# Patient Record
Sex: MALE | Race: WHITE | Employment: OTHER | URBAN - METROPOLITAN AREA
[De-identification: names, ages, dates, MRNs, and addresses within clinical notes are randomized per-mention and may not be internally consistent; named-entity substitution may affect disease eponyms.]

---

## 2018-07-08 ENCOUNTER — HOSPITAL ENCOUNTER (OUTPATIENT)
Age: 43
Discharge: HOME | End: 2018-07-08
Attending: EMERGENCY MEDICINE
Payer: COMMERCIAL

## 2018-07-08 VITALS
OXYGEN SATURATION: 100 % | SYSTOLIC BLOOD PRESSURE: 133 MMHG | BODY MASS INDEX: 29.4 KG/M2 | RESPIRATION RATE: 18 BRPM | WEIGHT: 210 LBS | DIASTOLIC BLOOD PRESSURE: 90 MMHG | HEIGHT: 71 IN | TEMPERATURE: 98 F | HEART RATE: 80 BPM

## 2018-07-08 DIAGNOSIS — R39.198 ABNORMALITY OF URINATION: ICD-10-CM

## 2018-07-08 DIAGNOSIS — Q64.32 CONGENITAL STRICTURE OF URETHRA: Primary | ICD-10-CM

## 2018-07-08 LAB
BILIRUB UR QL STRIP: NEGATIVE
CLARITY UR: CLEAR
COLOR UR: YELLOW
GLUCOSE BLDC GLUCOMTR-MCNC: 161 MG/DL (ref 70–99)
GLUCOSE UR STRIP-MCNC: 500 MG/DL
HGB UR QL STRIP: NEGATIVE
KETONES UR STRIP-MCNC: NEGATIVE MG/DL
LEUKOCYTE ESTERASE UR QL STRIP: NEGATIVE
NITRITE UR QL STRIP: NEGATIVE
PH UR STRIP: 5.5 [PH]
PROT UR STRIP-MCNC: NEGATIVE MG/DL
SP GR UR STRIP: >=1.03
UROBILINOGEN UR STRIP-ACNC: <2 MG/DL

## 2018-07-08 PROCEDURE — 99204 OFFICE O/P NEW MOD 45 MIN: CPT

## 2018-07-08 PROCEDURE — 87086 URINE CULTURE/COLONY COUNT: CPT | Performed by: EMERGENCY MEDICINE

## 2018-07-08 PROCEDURE — 99203 OFFICE O/P NEW LOW 30 MIN: CPT

## 2018-07-08 PROCEDURE — 81003 URINALYSIS AUTO W/O SCOPE: CPT

## 2018-07-08 PROCEDURE — 82962 GLUCOSE BLOOD TEST: CPT

## 2018-07-08 RX ORDER — MULTIVITAMIN
TABLET ORAL
COMMUNITY

## 2018-07-08 RX ORDER — LISINOPRIL 5 MG/1
TABLET ORAL
COMMUNITY
Start: 2018-02-14

## 2018-07-08 NOTE — ED NOTES
Patient instructed to follow up with PMD for elevated sugar and glucose in urine and to see urologist.

## 2018-07-08 NOTE — ED PROVIDER NOTES
Patient Seen in: [facility] In [address] [place] [address]    History   Patient presents with:  Urinary Symptoms (urologic)    Stated Complaint: blood in urine    HPI    The patient is a 40-year-old male with a history of hypospadias, stricture at the (5' 11\")   Wt 95.3 kg   SpO2 100%   BMI 29.29 kg/m²         Physical Exam    Alert male no acute distress  Abdomen: Normoactive bowel sounds, nontender nondistended  Back: No CVA tenderness  : Uncircumcised  Stricture at the meatus noted  Nontender test

## 2024-04-11 ENCOUNTER — HOSPITAL ENCOUNTER (OUTPATIENT)
Age: 49
Discharge: HOME | End: 2024-04-11

## 2024-04-11 ENCOUNTER — APPOINTMENT (OUTPATIENT)
Dept: GENERAL RADIOLOGY | Age: 49
End: 2024-04-11
Attending: NURSE PRACTITIONER

## 2024-04-11 VITALS
RESPIRATION RATE: 16 BRPM | TEMPERATURE: 98 F | OXYGEN SATURATION: 96 % | DIASTOLIC BLOOD PRESSURE: 93 MMHG | HEART RATE: 84 BPM | SYSTOLIC BLOOD PRESSURE: 156 MMHG

## 2024-04-11 DIAGNOSIS — S13.9XXA CERVICAL SPRAIN, INITIAL ENCOUNTER: ICD-10-CM
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
## 2024-04-11 PROCEDURE — 72040 X-RAY EXAM NECK SPINE 2-3 VW: CPT | Performed by: NURSE PRACTITIONER

## 2024-04-11 PROCEDURE — 99203 OFFICE O/P NEW LOW 30 MIN: CPT | Performed by: NURSE PRACTITIONER

## 2024-04-11 RX ORDER — CYCLOBENZAPRINE HCL 10 MG
10 TABLET ORAL 3 TIMES DAILY PRN
Qty: 20 TABLET | Refills: 0 | Status: SHIPPED | OUTPATIENT
Start: 2024-04-11 | End: 2024-04-18
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
## 2024-04-11 NOTE — DISCHARGE INSTRUCTIONS
Tylenol/ibuprofen as needed for pain  Ice to your sore areas over the next day then use heat  Do not drink or drive if taking muscle relaxer  Follow-up with your primary care doctor as needed  Return to the emergency department for any new or worsening symptoms at any time

## 2024-10-28 ENCOUNTER — OFFICE VISIT (OUTPATIENT)
Dept: SURGERY | Facility: CLINIC | Age: 49
End: 2024-10-28

## 2024-10-28 DIAGNOSIS — N35.811 OTHER STRICTURE OF URETHRAL MEATUS IN MALE: ICD-10-CM

## 2024-10-28 DIAGNOSIS — R97.20 ELEVATED PSA: Primary | ICD-10-CM

## 2024-10-28 DIAGNOSIS — R82.90 URINE FINDING: ICD-10-CM

## 2024-10-28 DIAGNOSIS — Q54.1 PENILE HYPOSPADIAS: ICD-10-CM

## 2024-10-28 LAB
BILIRUBIN: NEGATIVE
GLUCOSE (URINE DIPSTICK): 100 MG/DL
KETONES (URINE DIPSTICK): NEGATIVE MG/DL
MULTISTIX LOT#: ABNORMAL NUMERIC
NITRITE, URINE: NEGATIVE
PH, URINE: 5.5 (ref 4.5–8)
PROTEIN (URINE DIPSTICK): NEGATIVE MG/DL
SPECIFIC GRAVITY: 1.01 (ref 1–1.03)
URINE-COLOR: YELLOW
UROBILINOGEN,SEMI-QN: 0.2 MG/DL (ref 0–1.9)

## 2024-10-28 PROCEDURE — 51798 US URINE CAPACITY MEASURE: CPT

## 2024-10-28 PROCEDURE — 81003 URINALYSIS AUTO W/O SCOPE: CPT

## 2024-10-28 PROCEDURE — 99204 OFFICE O/P NEW MOD 45 MIN: CPT

## 2024-10-28 RX ORDER — METFORMIN HYDROCHLORIDE 500 MG/1
1 TABLET, EXTENDED RELEASE ORAL 2 TIMES DAILY WITH MEALS
COMMUNITY
Start: 2022-02-14

## 2024-10-28 RX ORDER — TAMSULOSIN HYDROCHLORIDE 0.4 MG/1
0.4 CAPSULE ORAL EVERY EVENING
Qty: 90 CAPSULE | Refills: 3 | Status: SHIPPED | OUTPATIENT
Start: 2024-10-28

## 2024-10-28 RX ORDER — DIAZEPAM 10 MG/1
10 TABLET ORAL SEE ADMIN INSTRUCTIONS
Qty: 2 TABLET | Refills: 0 | Status: SHIPPED | OUTPATIENT
Start: 2024-10-28

## 2024-10-28 NOTE — PROGRESS NOTES
[facility], [facility]    Urology Consult Note    History of Present Illness:   Patient is a(n) 49 year old male with hx of hypospadia who presents for elevated PSA.    Recently had routine PSA checked on annual that returned at 3.24 on 7/22/24.     Prior PSAs   11/10/23 1.47  4/12/23 1.52  10/8/22 1.75  All prior <1.7     Hx of stricture vs. hypospadia that required dilation in 2018 with [facility].  Sx at the time with recurrent UTI, split stream, weak stream. Dilation was done in office and pt had lots of pain. Was supposed to CIC afterwards but could not tolerate. Noticed large stream improvement afterwards. Uro at the time recommended ?slit procedure for ?hypospadia following dilation but he opted against.     Stream getting weaker in the past couple yrs. No recurrent UTIs since dilation. Now with increasing frequency and urgency, especially at night. PVR today 366mL before double voiding. 185mL afterwards. Occasionally has an episode every few mos where he cannot void unless shifting penis a certain way, then large amount comes out.     The only records I can see is from 2010 when he first started getting UTIs, weak stream, and split stream. Ultimately decided to go with conservative mgmt since PVRs were low at the time despite presence of hypospadia.     UA today trace intact blood. No urinary complaints of dysuria or gross hematuria.     No issues with erections.     HISTORY:  Past Medical History:    Hypospadias      Past Surgical History:   Procedure Laterality Date    Hernia surgery  2009    umbilical and LIH    Other surgical history  age 6    surg for bleeding ulcer    Other surgical history  10/11/10    Dr. Slick Belle      Family History   Problem Relation Age of Onset    Other Mother         GERD    Lipids Brother       Social History:   Social History     Socioeconomic History    Marital status: Single   Tobacco Use    Smoking status: Never    Smokeless tobacco:  Never   Substance and Sexual Activity    Alcohol use: Yes     Comment: rare    Drug use: No     Social Drivers of Health      Received from [facility]    Social Connections    Received from [facility]    Housing Stability        Allergies  Allergies[1]    Review of Systems:   A 10-point review of systems was completed and is negative other than as noted above.    Physical Exam:   There were no vitals taken for this visit.    GENERAL APPEARANCE: no acute distress  NEUROLOGIC: converses appropriately  HEAD: atraumatic, normocephalic  LUNGS: non-labored breathing  ABDOMEN: soft, nontender, non-distended  BACK: no CVA tenderness  PSYCH: appropriate affect and mood  INGUINAL CANALS: no hernias  PENILE MEATUS: open and very narrowed, possible coronal/subcoronal hypospadia   PENIS: normal  SCROTUM: normal, no varicocele  TESTES: normal anatomy  EPIDIDYMIS: normal anatomy      Results:     Laboratory Data:  Lab Results   Component Value Date    WBC 6.0 07/21/2010     07/21/2010     Lab Results   Component Value Date     05/05/2011    K 4.3 05/05/2011     05/05/2011    CO2 24 05/05/2011    BUN 13 05/05/2011    AST 28 07/21/2010    ALT 44 07/21/2010    TP 7.7 07/21/2010    ALB 4.9 07/21/2010       Urinalysis Results (last 3 years):  Recent Labs     10/28/24  1051   SPECGRAVITY 1.015   PHURINE 5.5   NITRITE Negative       Urine Culture Results (last 3 years):  Lab Results   Component Value Date    URINECUL No Growth at 18-24 hrs. 07/08/2018       Imaging  No results found.      Impression:   Recommendations:  Elevated PSA  - repeat PSA and if elevated, discussed need for 4k, MRI, and biopsy    Hypospadia  Urethral stricture  Urinary retention  - RTC for cysto; requested valium  - discussed can trial tamsulosin but unlikely this will have large benefit    Thank you very much for this consult. Please call if there are any questions or concerns.     Denise Rush,  YANIRA  Urology  [facility]  Phone: [phone]    Date: 10/28/2024  Time: 11:03 AM         [1] No Known Allergies

## 2024-11-04 ENCOUNTER — LAB ENCOUNTER (OUTPATIENT)
Dept: LAB | Age: 49
End: 2024-11-04
Payer: COMMERCIAL

## 2024-11-04 ENCOUNTER — TELEPHONE (OUTPATIENT)
Dept: SURGERY | Facility: CLINIC | Age: 49
End: 2024-11-04

## 2024-11-04 DIAGNOSIS — R97.20 ELEVATED PSA: Primary | ICD-10-CM

## 2024-11-04 PROCEDURE — 36415 COLL VENOUS BLD VENIPUNCTURE: CPT

## 2024-11-04 PROCEDURE — 84154 ASSAY OF PSA FREE: CPT

## 2024-11-04 PROCEDURE — 84153 ASSAY OF PSA TOTAL: CPT

## 2024-11-04 NOTE — TELEPHONE ENCOUNTER
Pt called.  Appointment on 10-28-24.  Went to the lab.  Advised there was no order for the psa blood test.  Please call pt

## 2024-11-04 NOTE — TELEPHONE ENCOUNTER
RN placed and order of repeat PSA per YANIRA Walker's notes on 10/28/24  RN called patient and updated. He agreed to completing it today.

## 2024-11-06 ENCOUNTER — LAB ENCOUNTER (OUTPATIENT)
Dept: LAB | Age: 49
End: 2024-11-06
Payer: COMMERCIAL

## 2024-11-06 DIAGNOSIS — R97.20 ELEVATED PSA: Primary | ICD-10-CM

## 2024-11-06 LAB
PROSTATE SPECIFIC AG: 3.6 NG/ML
PROSTATE SPECIFIC AG: 3.6 NG/ML

## 2024-11-06 PROCEDURE — 36415 COLL VENOUS BLD VENIPUNCTURE: CPT

## 2024-11-08 ENCOUNTER — TELEPHONE (OUTPATIENT)
Dept: SURGERY | Facility: CLINIC | Age: 49
End: 2024-11-08

## 2024-11-08 DIAGNOSIS — R97.20 ELEVATED PSA: Primary | ICD-10-CM

## 2024-11-08 NOTE — TELEPHONE ENCOUNTER
Per patient the soonest he could get scheduled for an mri is in December and was told that if Denise changed the order to stat he could get in next week. Please advise

## 2024-11-19 ENCOUNTER — HOSPITAL ENCOUNTER (OUTPATIENT)
Dept: MRI IMAGING | Facility: HOSPITAL | Age: 49
Discharge: HOME | End: 2024-11-19
Payer: COMMERCIAL

## 2024-11-19 DIAGNOSIS — R97.20 ELEVATED PSA: ICD-10-CM

## 2024-11-19 PROCEDURE — 72197 MRI PELVIS W/O & W/DYE: CPT

## 2024-11-19 PROCEDURE — A9575 INJ GADOTERATE MEGLUMI 0.1ML: HCPCS

## 2024-11-19 RX ORDER — GADOTERATE MEGLUMINE 376.9 MG/ML
20 INJECTION INTRAVENOUS
Status: COMPLETED | OUTPATIENT
Start: 2024-11-19 | End: 2024-11-19

## 2024-11-19 RX ADMIN — GADOTERATE MEGLUMINE 20 ML: 376.9 INJECTION INTRAVENOUS at 09:11:00

## 2024-11-20 NOTE — H&P (VIEW-ONLY)
HPI:     Colton Chris is a 49 year old male with a PMH of HTN, DM.    New to me, saw Denise in the past.    Following for:  1. Elevated PSA   2. H/o hypospadias and stricture  - s/p dilation in 2018 with [facility]  3. Recurrent UTIs    PCP - [facility]  Prior Urologist - Denise 10/28/24, Oh 10/11/10    Presents to establish care with me, review pMRI, office cysto, discuss next steps.  Had split stream, weak stream, recurrent UTI in 2018 and required dilation for urethral stricture. Stream improved significantly afterwards. Uro recommended slit procedure for hypospadias at that time but pt declined.    Stream has been getting weaker for the past couple years. No UTIs since dilation. Occasionally has episode every few mo where he cannot void unless he shifts penis in certain way, then large amount comes out.    AUA SS is 7/35 with 2 n, VINNY; 1 w, f. Mixed feelings towards LUTS.  Incontinence: none  Penoscrotal: severe meatal stenosis, subcoronal hypospadias    UA is negative and PVR is 39 mL. Was 366 mL and double voided with repeat  mL.    Prior PSAs:  - 3.6 11/4/24  - 3.24 7/22/24  - 1.47 11/10/23  - 1.52 4/12/23  - 1.75 10/8/22  - < 2 y prior    pMRI 11/19/24: ~ 28 g, Pi2    I attempted to perform meatal dilation at the bedside and the patient started writhing around even during attempts to place the lidocaine jelly.  After discussing options he would like to proceed with meatal dilation and flexible cystoscopy under anesthesia. We discussed the risks and benefits to the procedure including, but not limited to, bleeding, infection, possible damage to surrounding structures. The patient understands and would like to proceed.    I would likely recommend he perform self dilation following the procedure ~ 2 x per week for a few months following to prevent recurrence. Continue annual PSA checks given negative pMRI.    HISTORY:  Past Medical History:    Hypospadias      Past Surgical History:   Procedure  Laterality Date    Hernia surgery  2009    umbilical and LIH    Other surgical history  age 6    surg for bleeding ulcer    Other surgical history  10/11/10    [facility], Dr. Kruger      Family History   Problem Relation Age of Onset    Other Mother         GERD    Lipids Brother       Social History:   Social History     Socioeconomic History    Marital status: Single   Tobacco Use    Smoking status: Never    Smokeless tobacco: Never   Substance and Sexual Activity    Alcohol use: Yes     Comment: rare    Drug use: No     Social Drivers of Health      Received from [facility]    Social Connections    Received from [facility]    Housing Stability        Medications (Active prior to today's visit):  Current Outpatient Medications   Medication Sig Dispense Refill    metFORMIN  MG Oral Tablet 24 Hr Take 1 tablet (500 mg total) by mouth 2 (two) times daily with meals.      tamsulosin 0.4 MG Oral Cap Take 1 capsule (0.4 mg total) by mouth every evening. 90 capsule 3    diazePAM (VALIUM) 10 MG Oral Tab Take 1 tablet (10 mg total) by mouth See Admin Instructions. Take 1-2 tablets by mouth 20-30 minutes before procedure. 2 tablet 0    lisinopril 5 MG Oral Tab TAKE 1 TABLET BY MOUTH ONCE DAILY      Multiple Vitamin (MULTI VITAMIN MENS) Oral Tab Take  by mouth.      PEPCID AC OR prn         Allergies:  Allergies[1]      ROS:     A comprehensive 10 point review of systems was completed.  Pertinent positives and negatives noted in the the HPI.    PHYSICAL EXAM:     GENERAL APPEARANCE: well, developed, well nourished, in no acute distress  NEUROLOGIC: nonfocal, alert and oriented  HEAD: normocephalic, atraumatic  EYES: sclera non-icteric  EARS: hearing intact  ORAL CAVITY: mucosa moist  NECK/THYROID: no obvious goiter or masses  LUNGS: nonlabored breathing  ABDOMEN: soft, no obvious masses or tenderness  SKIN: no obvious rashes    : as noted above    ASSESSMENT/PLAN:   Diagnoses and all  orders for this visit:    Elevated PSA  -     URINALYSIS, AUTO, W/O SCOPE  -     sulfamethoxazole-trimethoprim DS (Bactrim DS) 800-160 MG per tab 1 tablet    Penile hypospadias    Recurrent UTI    Hypospadias, unspecified hypospadias type    - as noted above.    Thanks again for this consult.    Viktor Muniz MD, FACS  Urologist  [facility]  Office: [phone]              [1] No Known Allergies

## 2024-11-20 NOTE — PROGRESS NOTES
HPI:     Colton Chris is a 49 year old male with a PMH of HTN, DM.    New to me, saw Denise in the past.    Following for:  1. Elevated PSA   2. H/o hypospadias and stricture  - s/p dilation in 2018 with [facility]  3. Recurrent UTIs    PCP - [facility]  Prior Urologist - Denise 10/28/24, Oh 10/11/10    Presents to establish care with me, review pMRI, office cysto, discuss next steps.  Had split stream, weak stream, recurrent UTI in 2018 and required dilation for urethral stricture. Stream improved significantly afterwards. Uro recommended slit procedure for hypospadias at that time but pt declined.    Stream has been getting weaker for the past couple years. No UTIs since dilation. Occasionally has episode every few mo where he cannot void unless he shifts penis in certain way, then large amount comes out.    AUA SS is 7/35 with 2 n, VINNY; 1 w, f. Mixed feelings towards LUTS.  Incontinence: none  Penoscrotal: severe meatal stenosis, subcoronal hypospadias    UA is negative and PVR is 39 mL. Was 366 mL and double voided with repeat  mL.    Prior PSAs:  - 3.6 11/4/24  - 3.24 7/22/24  - 1.47 11/10/23  - 1.52 4/12/23  - 1.75 10/8/22  - < 2 y prior    pMRI 11/19/24: ~ 28 g, Pi2    I attempted to perform meatal dilation at the bedside and the patient started writhing around even during attempts to place the lidocaine jelly.  After discussing options he would like to proceed with meatal dilation and flexible cystoscopy under anesthesia. We discussed the risks and benefits to the procedure including, but not limited to, bleeding, infection, possible damage to surrounding structures. The patient understands and would like to proceed.    I would likely recommend he perform self dilation following the procedure ~ 2 x per week for a few months following to prevent recurrence. Continue annual PSA checks given negative pMRI.    HISTORY:  Past Medical History:    Hypospadias      Past Surgical History:   Procedure  Laterality Date    Hernia surgery  2009    umbilical and LIH    Other surgical history  age 6    surg for bleeding ulcer    Other surgical history  10/11/10    [facility], Dr. Kruger      Family History   Problem Relation Age of Onset    Other Mother         GERD    Lipids Brother       Social History:   Social History     Socioeconomic History    Marital status: Single   Tobacco Use    Smoking status: Never    Smokeless tobacco: Never   Substance and Sexual Activity    Alcohol use: Yes     Comment: rare    Drug use: No     Social Drivers of Health      Received from [facility]    Social Connections    Received from [facility]    Housing Stability        Medications (Active prior to today's visit):  Current Outpatient Medications   Medication Sig Dispense Refill    metFORMIN  MG Oral Tablet 24 Hr Take 1 tablet (500 mg total) by mouth 2 (two) times daily with meals.      tamsulosin 0.4 MG Oral Cap Take 1 capsule (0.4 mg total) by mouth every evening. 90 capsule 3    diazePAM (VALIUM) 10 MG Oral Tab Take 1 tablet (10 mg total) by mouth See Admin Instructions. Take 1-2 tablets by mouth 20-30 minutes before procedure. 2 tablet 0    lisinopril 5 MG Oral Tab TAKE 1 TABLET BY MOUTH ONCE DAILY      Multiple Vitamin (MULTI VITAMIN MENS) Oral Tab Take  by mouth.      PEPCID AC OR prn         Allergies:  Allergies[1]      ROS:     A comprehensive 10 point review of systems was completed.  Pertinent positives and negatives noted in the the HPI.    PHYSICAL EXAM:     GENERAL APPEARANCE: well, developed, well nourished, in no acute distress  NEUROLOGIC: nonfocal, alert and oriented  HEAD: normocephalic, atraumatic  EYES: sclera non-icteric  EARS: hearing intact  ORAL CAVITY: mucosa moist  NECK/THYROID: no obvious goiter or masses  LUNGS: nonlabored breathing  ABDOMEN: soft, no obvious masses or tenderness  SKIN: no obvious rashes    : as noted above    ASSESSMENT/PLAN:   Diagnoses and all  orders for this visit:    Elevated PSA  -     URINALYSIS, AUTO, W/O SCOPE  -     sulfamethoxazole-trimethoprim DS (Bactrim DS) 800-160 MG per tab 1 tablet    Penile hypospadias    Recurrent UTI    Hypospadias, unspecified hypospadias type    - as noted above.    Thanks again for this consult.    Viktor Muniz MD, FACS  Urologist  [facility]  Office: [phone]              [1] No Known Allergies

## 2024-11-27 ENCOUNTER — TELEPHONE (OUTPATIENT)
Dept: SURGERY | Facility: CLINIC | Age: 49
End: 2024-11-27

## 2024-11-27 ENCOUNTER — PROCEDURE (OUTPATIENT)
Dept: SURGERY | Facility: CLINIC | Age: 49
End: 2024-11-27

## 2024-11-27 DIAGNOSIS — Q54.1 PENILE HYPOSPADIAS: ICD-10-CM

## 2024-11-27 DIAGNOSIS — Q54.9 HYPOSPADIAS, UNSPECIFIED HYPOSPADIAS TYPE: ICD-10-CM

## 2024-11-27 DIAGNOSIS — N39.0 RECURRENT UTI: ICD-10-CM

## 2024-11-27 DIAGNOSIS — R97.20 ELEVATED PSA: Primary | ICD-10-CM

## 2024-11-27 DIAGNOSIS — Q64.33 CONGENITAL MEATAL STENOSIS: Primary | ICD-10-CM

## 2024-11-27 LAB
APPEARANCE: CLEAR
BILIRUBIN: NEGATIVE
GLUCOSE (URINE DIPSTICK): NEGATIVE MG/DL
KETONES (URINE DIPSTICK): NEGATIVE MG/DL
LEUKOCYTES: NEGATIVE
MULTISTIX LOT#: NORMAL NUMERIC
NITRITE, URINE: NEGATIVE
OCCULT BLOOD: NEGATIVE
PH, URINE: 5.5 (ref 4.5–8)
PROTEIN (URINE DIPSTICK): NEGATIVE MG/DL
SPECIFIC GRAVITY: 1.02 (ref 1–1.03)
URINE-COLOR: YELLOW
UROBILINOGEN,SEMI-QN: 0.2 MG/DL (ref 0–1.9)

## 2024-11-27 PROCEDURE — 99214 OFFICE O/P EST MOD 30 MIN: CPT | Performed by: UROLOGY

## 2024-11-27 PROCEDURE — 81003 URINALYSIS AUTO W/O SCOPE: CPT | Performed by: UROLOGY

## 2024-11-27 PROCEDURE — 51798 US URINE CAPACITY MEASURE: CPT | Performed by: UROLOGY

## 2024-11-27 RX ORDER — SULFAMETHOXAZOLE AND TRIMETHOPRIM 800; 160 MG/1; MG/1
1 TABLET ORAL ONCE
Status: COMPLETED | OUTPATIENT
Start: 2024-11-27 | End: 2024-11-27

## 2024-11-27 RX ADMIN — SULFAMETHOXAZOLE AND TRIMETHOPRIM 1 TABLET: 800; 160 TABLET ORAL at 08:38:00

## 2024-11-27 NOTE — TELEPHONE ENCOUNTER
Please schedule this patient for surgery next Thursday and OK room 7 at 7 am per scheduling.    EUGENIE Rush    Urology Surgery Request  Surgeon: Flavio  Location (if known): [facility]  Procedure: meatal dilation, flexible cystoscopy   Anesthesia: General   Time Frame: next Thursday  Time required: 20 minutes  Diagnosis: meatal stenosis    Antibiotics: per hospital protocol unless checked below   ___ Levaquin 500 mg IV   ___ Gemcitabine 2 g/100 mL NS bladder instillation to be given in OR    Estimated Post Op/Follow Up Appt: SURAJ

## 2024-11-27 NOTE — TELEPHONE ENCOUNTER
Please let patient know we were able to get him scheduled for next Thursday at 7 AM for his procedure.  They should call him the night before.  He typically needs to arrive at least an hour prior but they will let him know.    Thanks,  MPH

## 2024-12-03 ENCOUNTER — TELEPHONE (OUTPATIENT)
Dept: SURGERY | Facility: CLINIC | Age: 49
End: 2024-12-03

## 2024-12-04 ENCOUNTER — PATIENT MESSAGE (OUTPATIENT)
Dept: SURGERY | Facility: CLINIC | Age: 49
End: 2024-12-04

## 2024-12-04 NOTE — TELEPHONE ENCOUNTER
Spoke with patient and reviewed catheter care.  Other questions were answered by [facility] RN.  Nurse appt made for 12/6/24 for gerardo removal.

## 2024-12-05 ENCOUNTER — HOSPITAL ENCOUNTER (OUTPATIENT)
Facility: HOSPITAL | Age: 49
Setting detail: HOSPITAL OUTPATIENT SURGERY
Discharge: HOME | End: 2024-12-05
Attending: UROLOGY | Admitting: UROLOGY
Payer: COMMERCIAL

## 2024-12-05 ENCOUNTER — ANESTHESIA (OUTPATIENT)
Dept: SURGERY | Facility: HOSPITAL | Age: 49
End: 2024-12-05
Payer: COMMERCIAL

## 2024-12-05 ENCOUNTER — ANESTHESIA EVENT (OUTPATIENT)
Dept: SURGERY | Facility: HOSPITAL | Age: 49
End: 2024-12-05
Payer: COMMERCIAL

## 2024-12-05 VITALS
DIASTOLIC BLOOD PRESSURE: 82 MMHG | HEIGHT: 70 IN | OXYGEN SATURATION: 96 % | BODY MASS INDEX: 30.49 KG/M2 | TEMPERATURE: 98 F | HEART RATE: 76 BPM | RESPIRATION RATE: 20 BRPM | WEIGHT: 213 LBS | SYSTOLIC BLOOD PRESSURE: 127 MMHG

## 2024-12-05 DIAGNOSIS — Q54.0 BALANIC HYPOSPADIAS: Primary | ICD-10-CM

## 2024-12-05 LAB — GLUCOSE BLD-MCNC: 165 MG/DL (ref 70–99)

## 2024-12-05 PROCEDURE — 0T7D8ZZ DILATION OF URETHRA, VIA NATURAL OR ARTIFICIAL OPENING ENDOSCOPIC: ICD-10-PCS | Performed by: UROLOGY

## 2024-12-05 PROCEDURE — 82962 GLUCOSE BLOOD TEST: CPT

## 2024-12-05 RX ORDER — ONDANSETRON 2 MG/ML
4 INJECTION INTRAMUSCULAR; INTRAVENOUS EVERY 6 HOURS PRN
Status: DISCONTINUED | OUTPATIENT
Start: 2024-12-05 | End: 2024-12-05

## 2024-12-05 RX ORDER — HYDROMORPHONE HYDROCHLORIDE 1 MG/ML
0.6 INJECTION, SOLUTION INTRAMUSCULAR; INTRAVENOUS; SUBCUTANEOUS EVERY 5 MIN PRN
Status: DISCONTINUED | OUTPATIENT
Start: 2024-12-05 | End: 2024-12-05

## 2024-12-05 RX ORDER — HYDROCODONE BITARTRATE AND ACETAMINOPHEN 5; 325 MG/1; MG/1
1 TABLET ORAL ONCE AS NEEDED
Status: DISCONTINUED | OUTPATIENT
Start: 2024-12-05 | End: 2024-12-05

## 2024-12-05 RX ORDER — LIDOCAINE HYDROCHLORIDE 10 MG/ML
INJECTION, SOLUTION EPIDURAL; INFILTRATION; INTRACAUDAL; PERINEURAL AS NEEDED
Status: DISCONTINUED | OUTPATIENT
Start: 2024-12-05 | End: 2024-12-05 | Stop reason: SURG

## 2024-12-05 RX ORDER — KETOROLAC TROMETHAMINE 30 MG/ML
INJECTION, SOLUTION INTRAMUSCULAR; INTRAVENOUS AS NEEDED
Status: DISCONTINUED | OUTPATIENT
Start: 2024-12-05 | End: 2024-12-05 | Stop reason: SURG

## 2024-12-05 RX ORDER — HYDROCODONE BITARTRATE AND ACETAMINOPHEN 5; 325 MG/1; MG/1
2 TABLET ORAL ONCE AS NEEDED
Status: DISCONTINUED | OUTPATIENT
Start: 2024-12-05 | End: 2024-12-05

## 2024-12-05 RX ORDER — ACETAMINOPHEN 500 MG
1000 TABLET ORAL ONCE AS NEEDED
Status: DISCONTINUED | OUTPATIENT
Start: 2024-12-05 | End: 2024-12-05

## 2024-12-05 RX ORDER — ACETAMINOPHEN 500 MG
1000 TABLET ORAL ONCE
Status: DISCONTINUED | OUTPATIENT
Start: 2024-12-05 | End: 2024-12-05 | Stop reason: HOSPADM

## 2024-12-05 RX ORDER — NALOXONE HYDROCHLORIDE 0.4 MG/ML
0.08 INJECTION, SOLUTION INTRAMUSCULAR; INTRAVENOUS; SUBCUTANEOUS AS NEEDED
Status: DISCONTINUED | OUTPATIENT
Start: 2024-12-05 | End: 2024-12-05

## 2024-12-05 RX ORDER — HYDROMORPHONE HYDROCHLORIDE 1 MG/ML
0.4 INJECTION, SOLUTION INTRAMUSCULAR; INTRAVENOUS; SUBCUTANEOUS EVERY 5 MIN PRN
Status: DISCONTINUED | OUTPATIENT
Start: 2024-12-05 | End: 2024-12-05

## 2024-12-05 RX ORDER — NICOTINE POLACRILEX 4 MG
30 LOZENGE BUCCAL
Status: DISCONTINUED | OUTPATIENT
Start: 2024-12-05 | End: 2024-12-05 | Stop reason: HOSPADM

## 2024-12-05 RX ORDER — LIDOCAINE HYDROCHLORIDE 20 MG/ML
JELLY TOPICAL AS NEEDED
Status: DISCONTINUED | OUTPATIENT
Start: 2024-12-05 | End: 2024-12-05 | Stop reason: HOSPADM

## 2024-12-05 RX ORDER — SODIUM CHLORIDE, SODIUM LACTATE, POTASSIUM CHLORIDE, CALCIUM CHLORIDE 600; 310; 30; 20 MG/100ML; MG/100ML; MG/100ML; MG/100ML
INJECTION, SOLUTION INTRAVENOUS CONTINUOUS
Status: DISCONTINUED | OUTPATIENT
Start: 2024-12-05 | End: 2024-12-05

## 2024-12-05 RX ORDER — HYDROMORPHONE HYDROCHLORIDE 1 MG/ML
0.2 INJECTION, SOLUTION INTRAMUSCULAR; INTRAVENOUS; SUBCUTANEOUS EVERY 5 MIN PRN
Status: DISCONTINUED | OUTPATIENT
Start: 2024-12-05 | End: 2024-12-05

## 2024-12-05 RX ORDER — PROCHLORPERAZINE EDISYLATE 5 MG/ML
5 INJECTION INTRAMUSCULAR; INTRAVENOUS EVERY 8 HOURS PRN
Status: DISCONTINUED | OUTPATIENT
Start: 2024-12-05 | End: 2024-12-05

## 2024-12-05 RX ORDER — CIPROFLOXACIN 500 MG/1
500 TABLET, FILM COATED ORAL 2 TIMES DAILY
Qty: 8 TABLET | Refills: 0 | Status: SHIPPED | OUTPATIENT
Start: 2024-12-05 | End: 2024-12-09

## 2024-12-05 RX ORDER — HYDROCODONE BITARTRATE AND ACETAMINOPHEN 5; 325 MG/1; MG/1
1 TABLET ORAL EVERY 6 HOURS PRN
Qty: 30 TABLET | Refills: 0 | Status: SHIPPED | OUTPATIENT
Start: 2024-12-05

## 2024-12-05 RX ORDER — SCOLOPAMINE TRANSDERMAL SYSTEM 1 MG/1
1 PATCH, EXTENDED RELEASE TRANSDERMAL ONCE
Status: DISCONTINUED | OUTPATIENT
Start: 2024-12-05 | End: 2024-12-05 | Stop reason: HOSPADM

## 2024-12-05 RX ORDER — MIDAZOLAM HYDROCHLORIDE 1 MG/ML
INJECTION INTRAMUSCULAR; INTRAVENOUS AS NEEDED
Status: DISCONTINUED | OUTPATIENT
Start: 2024-12-05 | End: 2024-12-05 | Stop reason: SURG

## 2024-12-05 RX ORDER — NICOTINE POLACRILEX 4 MG
15 LOZENGE BUCCAL
Status: DISCONTINUED | OUTPATIENT
Start: 2024-12-05 | End: 2024-12-05 | Stop reason: HOSPADM

## 2024-12-05 RX ORDER — DOCUSATE SODIUM 100 MG/1
100 CAPSULE, LIQUID FILLED ORAL 2 TIMES DAILY
Qty: 28 CAPSULE | Refills: 0 | Status: SHIPPED | OUTPATIENT
Start: 2024-12-05 | End: 2024-12-19

## 2024-12-05 RX ORDER — DEXTROSE MONOHYDRATE 25 G/50ML
50 INJECTION, SOLUTION INTRAVENOUS
Status: DISCONTINUED | OUTPATIENT
Start: 2024-12-05 | End: 2024-12-05 | Stop reason: HOSPADM

## 2024-12-05 RX ORDER — KETOROLAC TROMETHAMINE 30 MG/ML
30 INJECTION, SOLUTION INTRAMUSCULAR; INTRAVENOUS EVERY 6 HOURS PRN
Status: DISCONTINUED | OUTPATIENT
Start: 2024-12-05 | End: 2024-12-05

## 2024-12-05 RX ADMIN — MIDAZOLAM HYDROCHLORIDE 2 MG: 1 INJECTION INTRAMUSCULAR; INTRAVENOUS at 07:09:00

## 2024-12-05 RX ADMIN — SODIUM CHLORIDE, SODIUM LACTATE, POTASSIUM CHLORIDE, CALCIUM CHLORIDE: 600; 310; 30; 20 INJECTION, SOLUTION INTRAVENOUS at 07:50:00

## 2024-12-05 RX ADMIN — LIDOCAINE HYDROCHLORIDE 10 MG: 10 INJECTION, SOLUTION EPIDURAL; INFILTRATION; INTRACAUDAL; PERINEURAL at 07:15:00

## 2024-12-05 NOTE — PROGRESS NOTES
HPI:     Colton Chris is a 49 year old male with a PMH of HTN, DM.    Following for:  1. Elevated PSA   - pMRI 11/19/24: ~ 28 g, Pi2  2. Hypospadias with balanic meatus and severe hypospadias  - s/p dilation in 2018 with Dr Perdue and 2nd procedure was recommended as pt was not amenable to meatal dilation  - s/p cysto + dilation with me 12/5/24 - noted to have severe/pinpoint ~ 1 cm stricture at meatus, mild BPH  3. Recurrent UTIs    PCP - [facility]  Prior Urologist - Denise 10/28/24, Oh 10/11/10    Event presents for Gerardo removal, discussed next steps.    He feels well. Appetite and energy are good  Gerardo draining clear yellow urine.    Stream has been getting weaker for the past couple years. No UTIs since dilation. Occasionally has episode every few mo where he cannot void unless he shifts penis in certain way, then large amount comes out.    AUA SS is 7/35 with 2 n, VINNY; 1 w, f. Mixed feelings towards LUTS.  Incontinence: none  Penoscrotal: balanic hypospadias with gerardo in place draining clear yellow urine    UA was negative and PVR was 39 mL. Was 366 mL and double voided with repeat  mL.    Prior PSAs:  - 3.6 11/4/24  - 3.24 7/22/24  - 1.47 11/10/23  - 1.52 4/12/23  - 1.75 10/8/22  - < 2 y prior    We discussed options for severe meatal stenosis s/p dilation.  We discussed that this will have a high chance of recurrence.  Options would include self dilation following the procedure ~ 2 x per week for a few months following to prevent recurrence will be could refer to a tertiary care center for urethroplasty.  We discussed the risks and benefits to each option and he requests referral to discuss urethroplasty.    Plan for observation for BPH/LUTS, f/u in 6 mo with PSA prior.    Prior note:  Presents to establish care with me, review pMRI, office cysto, discuss next steps.  Had split stream, weak stream, recurrent UTI in 2018 and required dilation for urethral stricture. Stream improved  significantly afterwards. Uro recommended slit procedure for hypospadias at that time but pt declined.    I attempted to perform meatal dilation at the bedside and the patient started writhing around even during attempts to place the lidocaine jelly.  After discussing options he would like to proceed with meatal dilation and flexible cystoscopy under anesthesia. We discussed the risks and benefits to the procedure including, but not limited to, bleeding, infection, possible damage to surrounding structures. The patient understands and would like to proceed.        HISTORY:  Past Medical History:    Diabetes (HCC)    High blood pressure    History of blood transfusion    History of stomach ulcers    In childhood around 6 years of age    Hx of motion sickness    Possibily on boats    Hypospadias    Osteoarthritis    Left shoulder    Visual impairment    Only for driving at night      Past Surgical History:   Procedure Laterality Date    Hernia surgery  2009    umbilical and LIH    Other surgical history  age 6    surg for bleeding ulcer    Other surgical history  10/11/10    Dr. Slick Belle      Family History   Problem Relation Age of Onset    Other Mother         GERD    Lipids Brother       Social History:   Social History     Socioeconomic History    Marital status: Single   Tobacco Use    Smoking status: Never    Smokeless tobacco: Never   Vaping Use    Vaping status: Never Used   Substance and Sexual Activity    Alcohol use: Yes     Comment: rare    Drug use: No     Social Drivers of Health      Received from [facility]    Social Connections    Received from [facility]    Housing Stability        Medications (Active prior to today's visit):  Current Outpatient Medications   Medication Sig Dispense Refill    HYDROcodone-acetaminophen (NORCO) 5-325 MG Oral Tab Take 1 tablet by mouth every 6 (six) hours as needed for Pain. 30 tablet 0    docusate sodium 100 MG Oral Cap Take 1  capsule (100 mg total) by mouth 2 (two) times daily for 14 days. Stop taking if loose stools/diarrhea. 28 capsule 0    ciprofloxacin 500 MG Oral Tab Take 1 tablet (500 mg total) by mouth 2 (two) times daily for 4 days. 8 tablet 0       Allergies:  Allergies[1]      ROS:     A comprehensive 10 point review of systems was completed.  Pertinent positives and negatives noted in the the HPI.    PHYSICAL EXAM:     GENERAL APPEARANCE: well, developed, well nourished, in no acute distress  NEUROLOGIC: nonfocal, alert and oriented  HEAD: normocephalic, atraumatic  EYES: sclera non-icteric  EARS: hearing intact  ORAL CAVITY: mucosa moist  NECK/THYROID: no obvious goiter or masses  LUNGS: nonlabored breathing  ABDOMEN: soft, no obvious masses or tenderness  SKIN: no obvious rashes    : as noted above    ASSESSMENT/PLAN:   There are no diagnoses linked to this encounter.  - as noted above.    Thanks again for this consult.    Viktor Muniz MD, FACS  Urologist  [facility]  Office: [phone]              [1] No Known Allergies

## 2024-12-05 NOTE — ADDENDUM NOTE
Addendum  created 12/05/24 0818 by Viktor Jeronimo MD    Intraprocedure Event edited, Intraprocedure Meds edited, Intraprocedure Staff edited

## 2024-12-05 NOTE — INTERVAL H&P NOTE
Pre-op Diagnosis: Congenital meatal stenosis [Q64.33]    The above referenced H&P was reviewed by Viktor Muniz MD on 12/5/2024, the patient was examined and no significant changes have occurred in the patient's condition since the H&P was performed.  I discussed with the patient and/or legal representative the potential benefits, risks and side effects of this procedure; the likelihood of the patient achieving goals; and potential problems that might occur during recuperation.  I discussed reasonable alternatives to the procedure, including risks, benefits and side effects related to the alternatives and risks related to not receiving this procedure.  We will proceed with procedure as planned.

## 2024-12-05 NOTE — DISCHARGE INSTRUCTIONS
You had a procedure called a CYSTOSCOPY today    - You may resume regular activity tomorrow.    - OK to use leg bag during the day. Use gravity bag at night and while sleeping. Keep gerardo bag below the level of the bladder at all times to prevent UTI. OK to shower.    - You may have a post-operative appointment that is already scheduled for you. If the appointment date or time does not work with your schedule please call our office to reschedule ([phone]). Alternatively our office may be reaching out to you to schedule the appointment.     - You may experience pain after the procedure for 1-2 days.  If pain becomes intolerable please contact our office or go to the nearest Emergency Room/Immediate Care. You make take over-the counter ibuprofen for mild pain (provided you do not have a medical condition such as stomach ulcers or kidney disease which prohibits you from taking). You may take this in addition to tylenol or narcotic pain medication. Hot packs may help for discomfort as well.    - If you take blood thinners (such as aspirin or plavix) please DO NOT take them when you go home. You may resume these medications in 10 days.    - If you are medically allowed to take ibuprofen then you may take 200-400 mg every 8 hours as needed for pain with plenty of fluids. You may take this in addition to tylenol or other pain medication which may be prescribed.     - You may experience burning with urination and frequency of urination over the next few days.     - You may see blood in your urine that should clear up within a few days. If you have a stent in place then you may see blood in the urine until the stent is eventually removed.     - Try to abstain from alcohol, coffee, tea, artificial sweeteners, and spicy food for the next 48 hours as these can irritate the bladder.     - If you develop a fever, chills, difficulty urinating or abdominal pain in the next 24 hours, call the office.     - Try to drink at least  1.5 to 2 liters of fluid per day to stay hydrated, water is preferable. If you are on a fluid restriction due to other medical reasons then you need to adhere to your fluid restriction recommendations.        You received a drug called Toradol which is an Anti Inflammatory at:  8:15am.  If you are allowed to take Anti inflammatories:    Do not take any Anti Inflammatory like Motrin, Aleve or Ibuprophen until after: 2:15pm.  Please report any suspected allergic reactions or bleeding issues to your doctor

## 2024-12-05 NOTE — OPERATIVE REPORT
Urology Operative Note    Attending Surgeon: Viktor Muniz MD    Patient Name: Colton Chris    Date of Surgery: 12/5/2024    Preoperative Diagnosis: hypospadias, meatal stenosis, weak stream    Postoperative Diagnosis: same    Procedure Performed: Cystoscopy, meatal dilation over wire guidance    Indication for procedure:  Patient is a 49 year old male who presented with a history of hypospadias requiring urethral dilation in the past.  He was told by prior urologist he should have a \"slit procedure\" keep his urethra open.  On exam in the office he had a pinpoint meatus at the coronal sulcus and declined dilation in the office.  The patient was counseled on options and elected to undergo the aforementioned procedure. We discussed the risks and benefits to surgery. We discussed risks including, but not limited to, bleeding, infection, possible damage to surrounding structures, possible need for repeat procedure(s). The patient understood these risks and wished to proceed with surgery.    Description of the procedure:  The patient was taken to the operating room and prepped and draped in lithotomy position after undergoing general anesthesia.  He was again noted to have a pinpoint meatus at the coronal sulcus.  I was able to pass a wire beyond the stricture into the bladder, which I confirmed using fluoroscopy.  I then dilated the meatus sequentially from 12 Guatemalan to 24 Guatemalan.  There was resistance noted at the tip of the penis but no resistance in the proximal urethra.  The cystoscope was inserted.  He was noted to have a ~ 1 cm area of stenosis which had been dilated at the meatus. This was now good caliber.  Remainder of the urethra was normal.  He was also noted to have mild BPH and his bladder was unremarkable.  I then placed a 20F [place]-tip gerardo over the wire into the bladder with return of clear yellow urine.  The patient was awoken having tolerated the procedure well.    Specimens:  none    Complications: No known complications.    Condition on Discharge from the operating room was stable    Plan: The patient will follow up tomorrow for Ray catheter removal and I discussed with his wife that he should undergo urethroplasty at some point as I strongly suggest suspect the stricture will recur and he is not amenable to meatal self dilation at home.    Viktor Muniz MD    Date: 12/5/2024 Time: 8:03 AM

## 2024-12-05 NOTE — PATIENT INSTRUCTIONS
- Armida Perdue MD. [facility]  Address: [address] [place] [address] [address], [place] [address]  Phone: [phone]    - Nita Chaudhry MD. [facility]  [phone]

## 2024-12-05 NOTE — ANESTHESIA POSTPROCEDURE EVALUATION
[facility] Patient Status:  Hospital Outpatient Surgery   Age/Gender 49 year old male MRN [id number]   Location [facility] SURGERY Attending Viktor Muniz MD   Hosp Day # 0 PCP SABINO LEVY       Anesthesia Post-op Note    CYSTOSCOPY, URETHRAL DILATION    Procedure Summary       Date: 12/05/24 Room / Location:  MAIN OR 07 / [facility] MAIN OR    Anesthesia Start: 0709 Anesthesia Stop:     Procedure: CYSTOSCOPY, URETHRAL DILATION (Urethra) Diagnosis:       Congenital meatal stenosis      (Congenital meatal stenosis [Q64.33])    Surgeons: Viktor Muniz MD Anesthesiologist: Viktor Jeronimo MD    Anesthesia Type: MAC ASA Status: 2            Anesthesia Type: MAC    Vitals Value Taken Time   /65 12/05/24 0757   Temp 98 12/05/24 0757   Pulse 65 12/05/24 0757   Resp 16 12/05/24 0757   SpO2 97 12/05/24 0757       Patient Location: Same Day Surgery    Anesthesia Type: MAC    Airway Patency: patent    Postop Pain Control: adequate    Mental Status: mildly sedated but able to meaningfully participate in the post-anesthesia evaluation    Nausea/Vomiting: none    Cardiopulmonary/Hydration status: stable euvolemic    Complications: no apparent anesthesia related complications    Postop vital signs: stable    Dental Exam: Unchanged from Preop    Patient to be discharged home when criteria met.

## 2024-12-05 NOTE — ANESTHESIA PREPROCEDURE EVALUATION
PRE-OP EVALUATION    Patient Name: Colton Chris    Admit Diagnosis: Congenital meatal stenosis [Q64.33]    Pre-op Diagnosis: Congenital meatal stenosis [Q64.33]    CYSTOSCOPY, URETHRAL DILATION    Anesthesia Procedure: CYSTOSCOPY, URETHRAL DILATION    Surgeons and Role:     * Viktor Muniz MD - Primary    Pre-op vitals reviewed.  Temp: 97.8 °F (36.6 °C)  Pulse: 75  Resp: 18  BP: 143/92  SpO2: 98 %  Body mass index is 30.56 kg/m².    Current medications reviewed.  Hospital Medications:   [Transfer Hold] acetaminophen (Tylenol Extra Strength) tab 1,000 mg  1,000 mg Oral Once    [Transfer Hold] scopolamine (Transderm-Scop) 1 MG/3DAYS patch 1 patch  1 patch Transdermal Once    [Transfer Hold] glucose (Dex4) 15 GM/59ML oral liquid 15 g  15 g Oral Q15 Min PRN    Or    [Transfer Hold] glucose (Glutose) 40% oral gel 15 g  15 g Oral Q15 Min PRN    Or    [Transfer Hold] glucose-vitamin C (Dex-4) chewable tab 4 tablet  4 tablet Oral Q15 Min PRN    Or    [Transfer Hold] dextrose 50% injection 50 mL  50 mL Intravenous Q15 Min PRN    Or    [Transfer Hold] glucose (Dex4) 15 GM/59ML oral liquid 30 g  30 g Oral Q15 Min PRN    Or    [Transfer Hold] glucose (Glutose) 40% oral gel 30 g  30 g Oral Q15 Min PRN    Or    [Transfer Hold] glucose-vitamin C (Dex-4) chewable tab 8 tablet  8 tablet Oral Q15 Min PRN    lactated ringers infusion   Intravenous Continuous    [COMPLETED] ceFAZolin (Ancef) 2g in 10mL IV syringe premix  2 g Intravenous Once    lidocaine (Urojet) 2 % urethral jelly    PRN       Outpatient Medications:   Prescriptions Prior to Admission[1]    Allergies: Patient has no known allergies.      Anesthesia Evaluation    Patient summary reviewed.    Anesthetic Complications  (-) history of anesthetic complications         GI/Hepatic/Renal           (+) PUD                      Cardiovascular    Negative cardiovascular ROS.    Exercise tolerance: good           (+) hypertension                                      Endo/Other      (+) diabetes  type 2, not using insulin                         Pulmonary    Negative pulmonary ROS.                       Neuro/Psych    Negative neuro/psych ROS.                                  Past Surgical History:   Procedure Laterality Date    Hernia surgery  2009    umbilical and LIH    Other surgical history  age 6    surg for bleeding ulcer    Other surgical history  10/11/10    Flow Dr. Kruger     Social History     Socioeconomic History    Marital status: Single   Tobacco Use    Smoking status: Never    Smokeless tobacco: Never   Vaping Use    Vaping status: Never Used   Substance and Sexual Activity    Alcohol use: Yes     Comment: rare    Drug use: No     History   Drug Use No     Available pre-op labs reviewed.               Airway             Cardiovascular    Cardiovascular exam normal.         Dental    Dentition appears grossly intact         Pulmonary    Pulmonary exam normal.                 Other findings              ASA: 2   Plan: MAC  NPO status verified and patient meets guidelines.    Post-procedure pain management plan discussed with surgeon and patient.      Plan/risks discussed with: patient and Other                Present on Admission:  **None**             [1]   Facility-Administered Medications Prior to Admission   Medication Dose Route Frequency Provider Last Rate Last Admin    [COMPLETED] sulfamethoxazole-trimethoprim DS (Bactrim DS) 800-160 MG per tab 1 tablet  1 tablet Oral Once Viktor Muniz MD   1 tablet at 11/27/24 0838     Medications Prior to Admission   Medication Sig Dispense Refill Last Dose/Taking    metFORMIN  MG Oral Tablet 24 Hr Take 1 tablet (500 mg total) by mouth 2 (two) times daily with meals.   12/4/2024 Morning    tamsulosin 0.4 MG Oral Cap Take 1 capsule (0.4 mg total) by mouth every evening. 90 capsule 3 11/21/2024    lisinopril 5 MG Oral Tab TAKE 1 TABLET BY MOUTH ONCE DAILY   12/4/2024 Morning    Multiple Vitamin (MULTI VITAMIN MENS)  Oral Tab Take  by mouth.   Taking    PEPCID AC OR prn   Taking    diazePAM (VALIUM) 10 MG Oral Tab Take 1 tablet (10 mg total) by mouth See Admin Instructions. Take 1-2 tablets by mouth 20-30 minutes before procedure. 2 tablet 0

## 2024-12-06 ENCOUNTER — OFFICE VISIT (OUTPATIENT)
Dept: SURGERY | Facility: CLINIC | Age: 49
End: 2024-12-06
Payer: COMMERCIAL

## 2024-12-06 DIAGNOSIS — Q64.33 CONGENITAL MEATAL STENOSIS: Primary | ICD-10-CM

## 2024-12-06 DIAGNOSIS — N39.0 RECURRENT UTI: ICD-10-CM

## 2024-12-06 DIAGNOSIS — R97.20 ELEVATED PSA: ICD-10-CM

## 2024-12-06 DIAGNOSIS — Q54.1 PENILE HYPOSPADIAS: ICD-10-CM

## 2024-12-06 LAB — GLUCOSE BLD-MCNC: 172 MG/DL (ref 70–99)

## 2024-12-06 PROCEDURE — 99214 OFFICE O/P EST MOD 30 MIN: CPT | Performed by: UROLOGY

## 2024-12-06 NOTE — PROGRESS NOTES
Entered the exam room and I introduced myself and verified the pt's name and . I explained that I will be removing his/her existing gerardo cath. I then assisted the pt onto the exam table and also to lower his/her bottom clothing to her ankles.  I then placed the pt on the exam table in supine position Detached the cath from the STAT lock. I then deflated the gerardo cath balloon of it's contents and I gently and slowly removed the gerardo cath without difficulty and pt tolerated it well. Explained to the pt that he/she needs to increase his water intake. Verbalized understanding.    Will return if unable to urinate.

## 2025-02-10 ENCOUNTER — TELEPHONE (OUTPATIENT)
Dept: SURGERY | Facility: CLINIC | Age: 50
End: 2025-02-10

## 2025-08-08 ENCOUNTER — LAB ENCOUNTER (OUTPATIENT)
Dept: LAB | Age: 50
End: 2025-08-08
Attending: UROLOGY

## 2025-08-08 DIAGNOSIS — E11.9 DIABETES MELLITUS WITHOUT COMPLICATION (HCC): ICD-10-CM

## 2025-08-08 DIAGNOSIS — E11.9 DIABETES MELLITUS WITHOUT COMPLICATION (HCC): Primary | ICD-10-CM

## 2025-08-08 DIAGNOSIS — R97.20 ELEVATED PSA: ICD-10-CM

## 2025-08-08 LAB
ALBUMIN SERPL-MCNC: 4.9 G/DL (ref 3.2–4.8)
ALBUMIN/GLOB SERPL: 1.8 (ref 1–2)
ALP LIVER SERPL-CCNC: 64 U/L (ref 45–117)
ALT SERPL-CCNC: 28 U/L (ref 10–49)
ANION GAP SERPL CALC-SCNC: 6 MMOL/L (ref 0–18)
AST SERPL-CCNC: 23 U/L (ref ?–34)
BASOPHILS # BLD AUTO: 0.02 X10(3) UL (ref 0–0.2)
BASOPHILS NFR BLD AUTO: 0.5 %
BILIRUB SERPL-MCNC: 0.6 MG/DL (ref 0.3–1.2)
BILIRUB UR QL: NEGATIVE
BUN BLD-MCNC: 11 MG/DL (ref 9–23)
BUN/CREAT SERPL: 10.7 (ref 10–20)
CALCIUM BLD-MCNC: 9.7 MG/DL (ref 8.7–10.4)
CHLORIDE SERPL-SCNC: 100 MMOL/L (ref 98–112)
CHOLEST SERPL-MCNC: 116 MG/DL (ref ?–200)
CLARITY UR: CLEAR
CO2 SERPL-SCNC: 32 MMOL/L (ref 21–32)
COLOR UR: COLORLESS
CREAT BLD-MCNC: 1.03 MG/DL (ref 0.7–1.3)
CREAT UR-SCNC: 55.4 MG/DL
DEPRECATED RDW RBC AUTO: 39.8 FL (ref 35.1–46.3)
EGFRCR SERPLBLD CKD-EPI 2021: 88 ML/MIN/1.73M2 (ref 60–?)
EOSINOPHIL # BLD AUTO: 0.1 X10(3) UL (ref 0–0.7)
EOSINOPHIL NFR BLD AUTO: 2.4 %
ERYTHROCYTE [DISTWIDTH] IN BLOOD BY AUTOMATED COUNT: 12.6 % (ref 11–15)
EST. AVERAGE GLUCOSE BLD GHB EST-MCNC: 166 MG/DL (ref 68–126)
FASTING PATIENT LIPID ANSWER: YES
FASTING STATUS PATIENT QL REPORTED: YES
GLOBULIN PLAS-MCNC: 2.7 G/DL (ref 2–3.5)
GLUCOSE BLD-MCNC: 168 MG/DL (ref 70–99)
GLUCOSE UR-MCNC: NORMAL MG/DL
HBA1C MFR BLD: 7.4 % (ref ?–5.7)
HCT VFR BLD AUTO: 42.5 % (ref 39–53)
HDLC SERPL-MCNC: 27 MG/DL (ref 40–59)
HGB BLD-MCNC: 14.2 G/DL (ref 13–17.5)
HGB UR QL STRIP.AUTO: NEGATIVE
IMM GRANULOCYTES # BLD AUTO: 0.01 X10(3) UL (ref 0–1)
IMM GRANULOCYTES NFR BLD: 0.2 %
KETONES UR-MCNC: NEGATIVE MG/DL
LDLC SERPL CALC-MCNC: 67 MG/DL (ref ?–100)
LEUKOCYTE ESTERASE UR QL STRIP.AUTO: NEGATIVE
LYMPHOCYTES # BLD AUTO: 1.15 X10(3) UL (ref 1–4)
LYMPHOCYTES NFR BLD AUTO: 27.8 %
MCH RBC QN AUTO: 28.9 PG (ref 26–34)
MCHC RBC AUTO-ENTMCNC: 33.4 G/DL (ref 31–37)
MCV RBC AUTO: 86.4 FL (ref 80–100)
MICROALBUMIN UR-MCNC: <0.3 MG/DL
MONOCYTES # BLD AUTO: 0.34 X10(3) UL (ref 0.1–1)
MONOCYTES NFR BLD AUTO: 8.2 %
NEUTROPHILS # BLD AUTO: 2.52 X10 (3) UL (ref 1.5–7.7)
NEUTROPHILS # BLD AUTO: 2.52 X10(3) UL (ref 1.5–7.7)
NEUTROPHILS NFR BLD AUTO: 60.9 %
NITRITE UR QL STRIP.AUTO: NEGATIVE
NONHDLC SERPL-MCNC: 89 MG/DL (ref ?–130)
OSMOLALITY SERPL CALC.SUM OF ELEC: 289 MOSM/KG (ref 275–295)
PH UR: 6.5 (ref 5–8)
PLATELET # BLD AUTO: 170 10(3)UL (ref 150–450)
POTASSIUM SERPL-SCNC: 4.3 MMOL/L (ref 3.5–5.1)
PROT SERPL-MCNC: 7.6 G/DL (ref 5.7–8.2)
PROT UR-MCNC: NEGATIVE MG/DL
PSA SERPL-MCNC: 1.08 NG/ML (ref ?–4)
RBC # BLD AUTO: 4.92 X10(6)UL (ref 4.3–5.7)
SODIUM SERPL-SCNC: 138 MMOL/L (ref 136–145)
SP GR UR STRIP: 1.01 (ref 1–1.03)
TRIGL SERPL-MCNC: 120 MG/DL (ref 30–149)
TSI SER-ACNC: 1.76 UIU/ML (ref 0.55–4.78)
UROBILINOGEN UR STRIP-ACNC: NORMAL
VLDLC SERPL CALC-MCNC: 18 MG/DL (ref 0–30)
WBC # BLD AUTO: 4.1 X10(3) UL (ref 4–11)

## 2025-08-08 PROCEDURE — 81003 URINALYSIS AUTO W/O SCOPE: CPT

## 2025-08-08 PROCEDURE — 80053 COMPREHEN METABOLIC PANEL: CPT

## 2025-08-08 PROCEDURE — 85025 COMPLETE CBC W/AUTO DIFF WBC: CPT

## 2025-08-08 PROCEDURE — 36415 COLL VENOUS BLD VENIPUNCTURE: CPT

## 2025-08-08 PROCEDURE — 84443 ASSAY THYROID STIM HORMONE: CPT

## 2025-08-08 PROCEDURE — 82570 ASSAY OF URINE CREATININE: CPT

## 2025-08-08 PROCEDURE — 82043 UR ALBUMIN QUANTITATIVE: CPT

## 2025-08-08 PROCEDURE — 83036 HEMOGLOBIN GLYCOSYLATED A1C: CPT

## 2025-08-08 PROCEDURE — 80061 LIPID PANEL: CPT

## 2025-08-08 PROCEDURE — 84153 ASSAY OF PSA TOTAL: CPT

## (undated) DEVICE — SOLUTION IRRIG 1000ML ST H2O AQUALITE PLAS

## (undated) DEVICE — SLEEVE COMPR MD KNEE LEN SGL USE KENDALL SCD

## (undated) DEVICE — CATHETER URETH 18FR BLLN 5CC SIL ALLY W/ SIL

## (undated) DEVICE — BAG DRNGE 19OZ VYN LEG W/ FLIP-FLO VLV FAB

## (undated) DEVICE — .038IN ANG TIP BX 5 ZIPWIRE

## (undated) DEVICE — RADIFOCUS GLIDEWIRE: Brand: GLIDEWIRE

## (undated) DEVICE — BAG DRNGE 2000ML URIN INF CTRL ANTIREFLX

## (undated) DEVICE — Device

## (undated) DEVICE — DEVICE STBL AD TRICOT POLY CLS CELL FOAM

## (undated) DEVICE — SPONGE 4X4 10PK

## (undated) DEVICE — SYRINGE MED 10ML LL TIP W/O SFTY DISP

## (undated) DEVICE — PACK PBDS CYSTOSCOPY

## (undated) DEVICE — NITINOL WIRE WITH HYDROPHILIC TIP: Brand: SENSOR

## (undated) DEVICE — GLOVE SUR 7 SENSICARE PI PIP CRM PWD F

## (undated) NOTE — LETTER
OUTSIDE TESTING RESULT REQUEST     IMPORTANT: FOR YOUR IMMEDIATE ATTENTION  Please FAX all test results listed below to: [phone]     Testing already done on or about: 24     * * * * If testing is NOT complete, arrange with patient A.S.A.P. * * * *      Patient Name: Colton Chris Martin  Surgery Date: 2024  Medical Record: [id number]  CSN: [id number]  : 3/29/1975 - A: 49 y     Sex: male  Surgeon(s):  Viktor Muniz MD  Procedure: CYSTOSCOPY, URETHRAL DILATION  Anesthesia Type: General     Surgeon: Viktor Muniz MD     The following Testing and Time Line are REQUIRED PER ANESTHESIA     EKG READ AND SIGNED WITHIN   90 days      Thank You,   Sent by:staff

## (undated) NOTE — LETTER
OUTSIDE TESTING RESULT REQUEST     IMPORTANT: FOR YOUR IMMEDIATE ATTENTION  Please FAX all test results listed below to: [phone]     Testing already done on or about: 2024     * * * * If testing is NOT complete, arrange with patient A.S.A.P. * * * *      Patient Name: Colton Chris Martin  Surgery Date: 2024  Medical Record: [id number]  CSN: [id number]  : 3/29/1975 - A: 49 y     Sex: male  Surgeon(s):  Viktor Muniz MD  Procedure: CYSTOSCOPY, URETHRAL DILATION  Anesthesia Type: General     Surgeon: Viktor Muniz MD     The following Testing and Time Line are REQUIRED PER ANESTHESIA     EKG READ AND SIGNED WITHIN   90 days      Thank You,   Sent by: KAYLA Louis